# Patient Record
Sex: FEMALE | Race: WHITE | Employment: FULL TIME | URBAN - METROPOLITAN AREA
[De-identification: names, ages, dates, MRNs, and addresses within clinical notes are randomized per-mention and may not be internally consistent; named-entity substitution may affect disease eponyms.]

---

## 2022-03-30 ENCOUNTER — HOSPITAL ENCOUNTER (INPATIENT)
Age: 32
LOS: 1 days | Discharge: HOME | End: 2022-04-01
Attending: OBSTETRICS & GYNECOLOGY | Admitting: OBSTETRICS & GYNECOLOGY
Payer: COMMERCIAL

## 2022-03-30 PROBLEM — Z34.90 PREGNANCY: Status: ACTIVE | Noted: 2022-03-30

## 2022-03-30 LAB
ABO + RH BLD: NORMAL
ALBUMIN SERPL-MCNC: 2.4 G/DL (ref 3.4–5)
ALBUMIN/GLOB SERPL: 0.6 {RATIO} (ref 0.8–1.7)
ALP SERPL-CCNC: 99 U/L (ref 45–117)
ALT SERPL-CCNC: 19 U/L (ref 13–56)
ANION GAP SERPL CALC-SCNC: 7 MMOL/L (ref 3–18)
AST SERPL-CCNC: 19 U/L (ref 10–38)
BASOPHILS # BLD: 0.1 K/UL (ref 0–0.1)
BASOPHILS NFR BLD: 0 % (ref 0–2)
BILIRUB SERPL-MCNC: 0.2 MG/DL (ref 0.2–1)
BLOOD GROUP ANTIBODIES SERPL: NORMAL
BUN SERPL-MCNC: 10 MG/DL (ref 7–18)
BUN/CREAT SERPL: 19 (ref 12–20)
CALCIUM SERPL-MCNC: 8 MG/DL (ref 8.5–10.1)
CHLORIDE SERPL-SCNC: 107 MMOL/L (ref 100–111)
CO2 SERPL-SCNC: 25 MMOL/L (ref 21–32)
CREAT SERPL-MCNC: 0.54 MG/DL (ref 0.6–1.3)
DIFFERENTIAL METHOD BLD: ABNORMAL
EOSINOPHIL # BLD: 0.1 K/UL (ref 0–0.4)
EOSINOPHIL NFR BLD: 1 % (ref 0–5)
ERYTHROCYTE [DISTWIDTH] IN BLOOD BY AUTOMATED COUNT: 11.9 % (ref 11.6–14.5)
FIBRONECTIN FETAL VAG QL: POSITIVE
GLOBULIN SER CALC-MCNC: 3.9 G/DL (ref 2–4)
GLUCOSE SERPL-MCNC: 73 MG/DL (ref 74–99)
HCT VFR BLD AUTO: 32 % (ref 35–45)
HGB BLD-MCNC: 11 G/DL (ref 12–16)
IMM GRANULOCYTES # BLD AUTO: 0.1 K/UL (ref 0–0.04)
IMM GRANULOCYTES NFR BLD AUTO: 1 % (ref 0–0.5)
LYMPHOCYTES # BLD: 2.2 K/UL (ref 0.9–3.6)
LYMPHOCYTES NFR BLD: 17 % (ref 21–52)
MCH RBC QN AUTO: 31.6 PG (ref 24–34)
MCHC RBC AUTO-ENTMCNC: 34.4 G/DL (ref 31–37)
MCV RBC AUTO: 92 FL (ref 78–100)
MONOCYTES # BLD: 0.7 K/UL (ref 0.05–1.2)
MONOCYTES NFR BLD: 5 % (ref 3–10)
NEUTS SEG # BLD: 10.1 K/UL (ref 1.8–8)
NEUTS SEG NFR BLD: 76 % (ref 40–73)
NRBC # BLD: 0 K/UL (ref 0–0.01)
NRBC BLD-RTO: 0 PER 100 WBC
PLATELET # BLD AUTO: 286 K/UL (ref 135–420)
PMV BLD AUTO: 10.5 FL (ref 9.2–11.8)
POTASSIUM SERPL-SCNC: 4.2 MMOL/L (ref 3.5–5.5)
PROT SERPL-MCNC: 6.3 G/DL (ref 6.4–8.2)
RBC # BLD AUTO: 3.48 M/UL (ref 4.2–5.3)
SODIUM SERPL-SCNC: 139 MMOL/L (ref 136–145)
SPECIMEN EXP DATE BLD: NORMAL
WBC # BLD AUTO: 13.3 K/UL (ref 4.6–13.2)

## 2022-03-30 PROCEDURE — 86900 BLOOD TYPING SEROLOGIC ABO: CPT

## 2022-03-30 PROCEDURE — 85025 COMPLETE CBC W/AUTO DIFF WBC: CPT

## 2022-03-30 PROCEDURE — 87086 URINE CULTURE/COLONY COUNT: CPT

## 2022-03-30 PROCEDURE — 74011250636 HC RX REV CODE- 250/636

## 2022-03-30 PROCEDURE — 74011250637 HC RX REV CODE- 250/637: Performed by: OBSTETRICS & GYNECOLOGY

## 2022-03-30 PROCEDURE — 80053 COMPREHEN METABOLIC PANEL: CPT

## 2022-03-30 PROCEDURE — 4A1HXCZ MONITORING OF PRODUCTS OF CONCEPTION, CARDIAC RATE, EXTERNAL APPROACH: ICD-10-PCS | Performed by: OBSTETRICS & GYNECOLOGY

## 2022-03-30 PROCEDURE — 74011250636 HC RX REV CODE- 250/636: Performed by: OBSTETRICS & GYNECOLOGY

## 2022-03-30 PROCEDURE — 82731 ASSAY OF FETAL FIBRONECTIN: CPT

## 2022-03-30 PROCEDURE — 75410000002 HC LABOR FEE PER 1 HR

## 2022-03-30 PROCEDURE — 75410000000 HC DELIVERY VAGINAL/SINGLE

## 2022-03-30 PROCEDURE — 88307 TISSUE EXAM BY PATHOLOGIST: CPT

## 2022-03-30 PROCEDURE — 75410000003 HC RECOV DEL/VAG/CSECN EA 0.5 HR

## 2022-03-30 RX ORDER — PROMETHAZINE HYDROCHLORIDE 25 MG/ML
25 INJECTION, SOLUTION INTRAMUSCULAR; INTRAVENOUS
Status: DISCONTINUED | OUTPATIENT
Start: 2022-03-30 | End: 2022-04-01 | Stop reason: HOSPADM

## 2022-03-30 RX ORDER — LANOLIN ALCOHOL/MO/W.PET/CERES
325 CREAM (GRAM) TOPICAL
COMMUNITY

## 2022-03-30 RX ORDER — SUCRALFATE 1 G/1
1 TABLET ORAL ONCE
Status: DISPENSED | OUTPATIENT
Start: 2022-03-30 | End: 2022-03-30

## 2022-03-30 RX ORDER — INDOMETHACIN 25 MG/1
50 CAPSULE ORAL
Status: DISCONTINUED | OUTPATIENT
Start: 2022-03-30 | End: 2022-04-01 | Stop reason: HOSPADM

## 2022-03-30 RX ORDER — BUPROPION HYDROCHLORIDE 150 MG/1
150 TABLET ORAL DAILY
COMMUNITY

## 2022-03-30 RX ORDER — AMOXICILLIN 250 MG
1 CAPSULE ORAL
Status: DISCONTINUED | OUTPATIENT
Start: 2022-03-30 | End: 2022-04-01 | Stop reason: HOSPADM

## 2022-03-30 RX ORDER — MAGNESIUM SULFATE HEPTAHYDRATE 40 MG/ML
INJECTION, SOLUTION INTRAVENOUS
Status: COMPLETED
Start: 2022-03-30 | End: 2022-03-30

## 2022-03-30 RX ORDER — OXYCODONE AND ACETAMINOPHEN 5; 325 MG/1; MG/1
2 TABLET ORAL
Status: DISCONTINUED | OUTPATIENT
Start: 2022-03-30 | End: 2022-04-01 | Stop reason: HOSPADM

## 2022-03-30 RX ORDER — IBUPROFEN 400 MG/1
800 TABLET ORAL
Status: DISCONTINUED | OUTPATIENT
Start: 2022-03-30 | End: 2022-04-01 | Stop reason: HOSPADM

## 2022-03-30 RX ORDER — MAGNESIUM SULFATE HEPTAHYDRATE 40 MG/ML
4 INJECTION, SOLUTION INTRAVENOUS ONCE
Status: COMPLETED | OUTPATIENT
Start: 2022-03-30 | End: 2022-03-30

## 2022-03-30 RX ORDER — INDOMETHACIN 25 MG/1
50 CAPSULE ORAL ONCE
Status: DISPENSED | OUTPATIENT
Start: 2022-03-30 | End: 2022-03-30

## 2022-03-30 RX ORDER — SODIUM CHLORIDE, SODIUM LACTATE, POTASSIUM CHLORIDE, CALCIUM CHLORIDE 600; 310; 30; 20 MG/100ML; MG/100ML; MG/100ML; MG/100ML
125 INJECTION, SOLUTION INTRAVENOUS CONTINUOUS
Status: DISCONTINUED | OUTPATIENT
Start: 2022-03-30 | End: 2022-04-01 | Stop reason: HOSPADM

## 2022-03-30 RX ORDER — MAGNESIUM SULFATE HEPTAHYDRATE 40 MG/ML
2 INJECTION, SOLUTION INTRAVENOUS CONTINUOUS
Status: DISCONTINUED | OUTPATIENT
Start: 2022-03-31 | End: 2022-04-01 | Stop reason: HOSPADM

## 2022-03-30 RX ORDER — LANOLIN ALCOHOL/MO/W.PET/CERES
3 CREAM (GRAM) TOPICAL
Status: DISCONTINUED | OUTPATIENT
Start: 2022-03-30 | End: 2022-04-01 | Stop reason: HOSPADM

## 2022-03-30 RX ORDER — BUTORPHANOL TARTRATE 2 MG/ML
2 INJECTION INTRAMUSCULAR; INTRAVENOUS
Status: DISCONTINUED | OUTPATIENT
Start: 2022-03-30 | End: 2022-04-01 | Stop reason: HOSPADM

## 2022-03-30 RX ORDER — ACETAMINOPHEN 325 MG/1
650 TABLET ORAL
Status: DISCONTINUED | OUTPATIENT
Start: 2022-03-30 | End: 2022-04-01 | Stop reason: HOSPADM

## 2022-03-30 RX ORDER — SWAB
1 SWAB, NON-MEDICATED MISCELLANEOUS DAILY
COMMUNITY

## 2022-03-30 RX ORDER — ONDANSETRON 2 MG/ML
4 INJECTION INTRAMUSCULAR; INTRAVENOUS
Status: DISCONTINUED | OUTPATIENT
Start: 2022-03-30 | End: 2022-04-01 | Stop reason: HOSPADM

## 2022-03-30 RX ORDER — SUCRALFATE 1 G/1
1 TABLET ORAL
Status: DISCONTINUED | OUTPATIENT
Start: 2022-03-30 | End: 2022-04-01 | Stop reason: HOSPADM

## 2022-03-30 RX ORDER — BETAMETHASONE SODIUM PHOSPHATE AND BETAMETHASONE ACETATE 3; 3 MG/ML; MG/ML
12 INJECTION, SUSPENSION INTRA-ARTICULAR; INTRALESIONAL; INTRAMUSCULAR; SOFT TISSUE EVERY 24 HOURS
Status: DISCONTINUED | OUTPATIENT
Start: 2022-03-30 | End: 2022-04-01 | Stop reason: HOSPADM

## 2022-03-30 RX ORDER — CALCIUM GLUCONATE 20 MG/ML
1 INJECTION, SOLUTION INTRAVENOUS AS NEEDED
Status: DISCONTINUED | OUTPATIENT
Start: 2022-03-30 | End: 2022-04-01 | Stop reason: HOSPADM

## 2022-03-30 RX ADMIN — SODIUM CHLORIDE, POTASSIUM CHLORIDE, SODIUM LACTATE AND CALCIUM CHLORIDE 1000 ML: 600; 310; 30; 20 INJECTION, SOLUTION INTRAVENOUS at 09:24

## 2022-03-30 RX ADMIN — MAGNESIUM SULFATE HEPTAHYDRATE 4 G: 40 INJECTION, SOLUTION INTRAVENOUS at 10:40

## 2022-03-30 RX ADMIN — BETAMETHASONE SODIUM PHOSPHATE AND BETAMETHASONE ACETATE 12 MG: 3; 3 INJECTION, SUSPENSION INTRA-ARTICULAR; INTRALESIONAL; INTRAMUSCULAR at 10:58

## 2022-03-30 RX ADMIN — MAGNESIUM SULFATE HEPTAHYDRATE 2 G/HR: 40 INJECTION, SOLUTION INTRAVENOUS at 11:01

## 2022-03-30 RX ADMIN — BUTORPHANOL TARTRATE 2 MG: 2 INJECTION, SOLUTION INTRAMUSCULAR; INTRAVENOUS at 11:02

## 2022-03-30 RX ADMIN — SUCRALFATE 1 G: 1 TABLET ORAL at 10:38

## 2022-03-30 RX ADMIN — INDOMETHACIN 50 MG: 25 CAPSULE ORAL at 10:38

## 2022-03-30 RX ADMIN — MAGNESIUM SULFATE IN WATER 2 G/HR: 40 INJECTION, SOLUTION INTRAVENOUS at 11:01

## 2022-03-30 NOTE — H&P
Ostetrical History and Physical    Subjective:     Date of Admission: 3/30/2022    Patient is a 36 y.o.  female admitted with  labor started at 7 am with ctx suddenly, no dysuria or lof, no bleeding. pnc uncomplicated. For Obstetric history, see prenantal.    Past Medical History:   Diagnosis Date    Acid indigestion     Asthma     Attention deficit disorder     Depression     Dermatophytosis       Past Surgical History:   Procedure Laterality Date    ARTHROTOMY,OPEN REPAIR MENISCUS      HX ACL RECONSTRUCTION      HX HEENT      wisdom teeth    HX ORTHOPAEDIC      RT KNEE MENISCUS       Prior to Admission medications    Medication Sig Start Date End Date Taking? Authorizing Provider   prenatal vit-iron fumarate-fa (PRENATAL PLUS with IRON) 28 mg iron- 800 mcg tab Take 1 Tablet by mouth daily. Yes Provider, Historical   ferrous sulfate (Iron) 325 mg (65 mg iron) tablet Take 325 mg by mouth Daily (before breakfast). Yes Provider, Historical     No Known Allergies   Social History     Tobacco Use    Smoking status: Former Smoker    Smokeless tobacco: Never Used   Substance Use Topics    Alcohol use: Not Currently     Comment: rare      Family History   Problem Relation Age of Onset    Hypertension Father     Elevated Lipids Father     Diabetes Maternal Uncle     Cancer Maternal Grandmother     Diabetes Maternal Grandfather         Review of Systems    Objective:     Height 5' 4\" (1.626 m), weight 80.3 kg (177 lb). No data recorded. No intake/output data recorded. No intake/output data recorded. @[email or web address]    Pelvic: Cervix5, Effaced:> 50%Station:-2  Ultrasound-cephalic   Data Review:   No results found for this or any previous visit (from the past 24 hour(s)).   Monitor:  Reactivity:present Variability:present Baseline:within normal limits    Assessment:ptl     Active Problems:    Pregnancy (3/30/2022)        Plan:celestone, pcn, indocin, double we will be able to stop ptl but will try     Prophylaxis:  Deep Vein Thrombosis Protocol Active:Yes    Check labs:    Check  Prenatal:    Disposition    Total time spent with patient:In-Patient    Signed By: Mary Alice Jensen MD                         March 30, 2022

## 2022-03-30 NOTE — PROGRESS NOTES
Patient presents to labor and delivery with complaints of contractions that started this am, also states she lost her mucous plus     0945- SVE- 3-4/80/-1    2835- Called Dr. Ramone Capellan    0557- Patient nauseated    1020- Dr. Ramone Capellan at bedside, SVE- 6-7, vernon placed    1106- Patient moved to labor rm2    1115- VANITA Mills (NICU)RN,  (RAMU) at bedside     1117- KRIS Ott CNM at bedside    1123- TOB live female infant    26- Patient up to restroom

## 2022-03-30 NOTE — PROGRESS NOTES
1117: called to room by nursing staff for eminent delivery of , 30 week patient patient of [facility]. Upon arrival in the room, patient found to be involuntarily pushing: SVE completed and confirmed C/C/+2. Lu catheter removed by nursing staff and patient prepared for delivery. 1123: With strong maternal effort, infant head and body delivered without difficulty en caul. Membranes ruptured, cord clamped and cut by CNM at approximately 30 sec of life. Infant handed off to awaiting  staff in stable condition with APGARS of 4/7/8. See nursery notes for additional information. 1127: Delivery of placenta, connolly, intact-sent to lab for evaluation. Perineum intact, [email or web address], EBL 150cc. Mom left in stable condition.

## 2022-03-30 NOTE — PROGRESS NOTES
Delivery Note    Obstetrician: zeny    Assistant: none    Pre-Delivery Diagnosis:  pregnancy <37 weeks and Single fetus    Post-Delivery Diagnosis: Female    Intrapartum Event: None    Procedure: Spontaneous vaginal delivery , nicu in attendance for delivery , baby intubated    Epidural: NO    Monitor:  Fetal Heart Tones - External and Uterine Contractions - External    Indications for instrumental delivery: none    Estimated Blood Loss: 150cc    Episiotomy: none    Laceration(s):  none    Laceration(s) repair: NO    Presentation: Cephalic    Fetal Description: alejandra    Fetal Position: Occiput Anterior    Birth Weight: pending    Birth Length: pending    Apgar - One Minute: pending from nicu    Apgar - Five Minutes: pending from nicu    Umbilical Cord: 3 vessels present    Specimens: placenta           Complications:  none           Cord Blood Results: This patient has no babies on file.   Prenatal Labs:     Lab Results   Component Value Date/Time    ABO/Rh(D) PENDING 2022 09:28 AM        Attending Attestation: I was present and scrubbed for the key portions of the procedure    Signed By:  Dale De Leon MD     2022 What Type Of Note Output Would You Prefer (Optional)?: Standard Output How Severe Are Your Spot(S)?: mild Have Your Spot(S) Been Treated In The Past?: has not been treated Hpi Title: Evaluation of Skin Lesions

## 2022-03-30 NOTE — PROGRESS NOTES
1240 TRANSFER - IN REPORT:    Verbal report received from ISSAC Johnson RN(name) on [employer]  being received from L & D(unit) for routine progression of care      Report consisted of patients Situation, Background, Assessment and   Recommendations(SBAR). Information from the following report(s) SBAR, Kardex, Intake/Output, MAR and Recent Results was reviewed with the receiving nurse. Pt oriented to room and unit, educated on EPDS form, pain management and plan of care. Pt denies any pain or needs at this time, educated to call staff for assistance to [facility] for second void. Opportunity for questions and clarification was provided. 1255 Assessment completed,  Fundal check performed, uterus is firm @ U with small bleeding, no clots observed. Pt denies any pain or needs at this time. 1312  Fundal check performed, uterus is firm @ U with small bleeding, no clots observed. Pt denies any pain or needs at this time. 1335 Fundal check performed, uterus is firm @ U with small bleeding, no clots observed, pad changed. Pt supplied breast pump R/T baby in NICU, educated on use and to pump every 3-4 hours to stimulate milk production, no questions at this time. Pt denies any pain or needs at this time. 1440 Assisted pt to  for second void of 400 cc urine, pt passed a dime and quarter sized clot, no placental fragments observed, pt observed to have a steady gait. Pt returned to bed fundus firm @ U with scant bleeding at this this,  pt educated to call staff if she feels any gushes or passes any more clots. Pt verbalized understanding. 1610 Pt denies any clots, needs or pain at this time. 1910 Bedside and Verbal shift change report given to HALLEY Douglas RN (oncoming nurse) by Rocio Hagen RN (offgoing nurse). Report included the following information SBAR, Kardex, Intake/Output, MAR and Recent Results.

## 2022-03-31 PROBLEM — Z3A.30 30 WEEKS GESTATION OF PREGNANCY: Status: ACTIVE | Noted: 2022-03-31

## 2022-03-31 LAB
BACTERIA SPEC CULT: NORMAL
HCT VFR BLD AUTO: 27.2 % (ref 35–45)
HGB BLD-MCNC: 9.4 G/DL (ref 12–16)
SERVICE CMNT-IMP: NORMAL

## 2022-03-31 PROCEDURE — 74011250637 HC RX REV CODE- 250/637: Performed by: OBSTETRICS & GYNECOLOGY

## 2022-03-31 PROCEDURE — 93005 ELECTROCARDIOGRAM TRACING: CPT

## 2022-03-31 PROCEDURE — 65270000029 HC RM PRIVATE

## 2022-03-31 PROCEDURE — 36415 COLL VENOUS BLD VENIPUNCTURE: CPT

## 2022-03-31 PROCEDURE — 85018 HEMOGLOBIN: CPT

## 2022-03-31 PROCEDURE — 74011250636 HC RX REV CODE- 250/636: Performed by: ADVANCED PRACTICE MIDWIFE

## 2022-03-31 PROCEDURE — 74011250636 HC RX REV CODE- 250/636: Performed by: OBSTETRICS & GYNECOLOGY

## 2022-03-31 RX ADMIN — ACETAMINOPHEN 650 MG: 325 TABLET ORAL at 07:35

## 2022-03-31 RX ADMIN — DOCUSATE SODIUM 50 MG AND SENNOSIDES 8.6 MG 1 TABLET: 8.6; 5 TABLET, FILM COATED ORAL at 20:30

## 2022-03-31 RX ADMIN — SODIUM CHLORIDE, POTASSIUM CHLORIDE, SODIUM LACTATE AND CALCIUM CHLORIDE 500 ML: 600; 310; 30; 20 INJECTION, SOLUTION INTRAVENOUS at 20:30

## 2022-03-31 RX ADMIN — ACETAMINOPHEN 650 MG: 325 TABLET ORAL at 19:45

## 2022-03-31 RX ADMIN — ONDANSETRON 4 MG: 2 INJECTION INTRAMUSCULAR; INTRAVENOUS at 19:45

## 2022-03-31 NOTE — PROGRESS NOTES
Progress Note    Patient: Padmini Greco MRN: [id number]  SSN: [id number]    YOB: 1990  Age: 36 y.o. Sex: female    [facility]      Subjective:     Postpartum Day: 1            Delivery: vaginal delivery    The patient feels well. The patient denies emotional concerns. The baby is in NICU, doing well so far. The patient is ambulating well. The patient  tolerating a normal diet. Flatus has been passed.     Objective:      Patient Vitals for the past 24 hrs:   BP Temp Pulse Resp SpO2 Height Weight   03/30/22 2342 (!) 117/42 98 °F (36.7 °C) 83 18 100 % -- --   03/30/22 1600 131/63 98 °F (36.7 °C) 70 17 100 % -- --   03/30/22 1255 133/66 97.4 °F (36.3 °C) 61 16 98 % -- --   03/30/22 1200 108/67 -- 69 -- -- -- --   03/30/22 1146 121/72 -- 74 -- -- -- --   03/30/22 1102 -- -- -- -- 100 % -- --   03/30/22 1057 -- -- -- -- 100 % -- --   03/30/22 1052 -- -- -- -- 99 % -- --   03/30/22 1047 -- -- -- -- 100 % -- --   03/30/22 1042 -- -- -- -- 100 % -- --   03/30/22 1037 -- -- -- -- 100 % -- --   03/30/22 1032 -- -- -- -- 100 % -- --   03/30/22 1027 -- -- -- -- 100 % -- --   03/30/22 1024 -- -- -- -- 100 % -- --   03/30/22 1019 -- -- -- -- 100 % -- --   03/30/22 1010 -- -- -- -- 100 % -- --   03/30/22 1009 -- -- -- -- 100 % -- --   03/30/22 1005 -- -- -- -- 100 % -- --   03/30/22 1000 -- -- -- -- 100 % -- --   03/30/22 0956 -- -- -- -- -- 5' 4\" (1.626 m) 80.3 kg (177 lb)   03/30/22 0955 -- -- -- -- 98 % -- --   03/30/22 0953 -- -- -- -- 100 % -- --   03/30/22 0948 -- -- -- -- 100 % -- --   03/30/22 0943 -- -- -- -- 100 % -- --   03/30/22 0938 -- -- -- -- 100 % -- --   03/30/22 0933 -- -- -- -- 100 % -- --   03/30/22 0928 -- -- -- -- 100 % -- --   03/30/22 0905 -- -- -- -- 100 % -- --   03/30/22 0900 126/64 -- (!) 57 -- 100 % -- --   03/30/22 0855 -- -- -- -- 90 % -- --   03/30/22 0849 -- -- -- -- 100 % -- --   03/30/22 0844 -- -- -- -- 100 % -- --   03/30/22 0840 -- -- -- -- 100 % -- --   03/30/22 1936 -- -- -- -- 100 % -- --   03/30/22 0833 -- -- -- -- 100 % -- --   03/30/22 0830 (!) 131/59 -- (!) 53 -- -- -- --   03/30/22 0828 -- -- -- -- 100 % -- --   03/30/22 0823 -- -- -- -- 100 % -- --   03/30/22 0818 -- -- -- -- 100 % -- --   03/30/22 0813 -- -- -- -- 97 % -- --   03/30/22 0811 134/75 -- (!) 59 -- -- -- --     Lochia:  appropriate   Uterine Fundus:   firm   Fundus Location:  -3   Incision:     DVT Evaluation:  No evidence of DVT seen on physical exam.  Negative Hansel's sign. No cords or calf tenderness. No significant calf/ankle edema. Lab/Data Review: All lab results for the last 24 hours reviewed. LABS: Recent Results (from the past 48 hour(s))   CBC WITH AUTOMATED DIFF    Collection Time: 03/30/22  9:28 AM   Result Value Ref Range    WBC 13.3 (H) 4.6 - 13.2 K/uL    RBC 3.48 (L) 4.20 - 5.30 M/uL    HGB 11.0 (L) 12.0 - 16.0 g/dL    HCT 32.0 (L) 35.0 - 45.0 %    MCV 92.0 78.0 - 100.0 FL    MCH 31.6 24.0 - 34.0 PG    MCHC 34.4 31.0 - 37.0 g/dL    RDW 11.9 11.6 - 14.5 %    PLATELET 973 441 - 409 K/uL    MPV 10.5 9.2 - 11.8 FL    NRBC 0.0 0  WBC    ABSOLUTE NRBC 0.00 0.00 - 0.01 K/uL    NEUTROPHILS 76 (H) 40 - 73 %    LYMPHOCYTES 17 (L) 21 - 52 %    MONOCYTES 5 3 - 10 %    EOSINOPHILS 1 0 - 5 %    BASOPHILS 0 0 - 2 %    IMMATURE GRANULOCYTES 1 (H) 0.0 - 0.5 %    ABS. NEUTROPHILS 10.1 (H) 1.8 - 8.0 K/UL    ABS. LYMPHOCYTES 2.2 0.9 - 3.6 K/UL    ABS. MONOCYTES 0.7 0.05 - 1.2 K/UL    ABS. EOSINOPHILS 0.1 0.0 - 0.4 K/UL    ABS. BASOPHILS 0.1 0.0 - 0.1 K/UL    ABS. IMM.  GRANS. 0.1 (H) 0.00 - 0.04 K/UL    DF AUTOMATED     TYPE & SCREEN    Collection Time: 03/30/22  9:28 AM   Result Value Ref Range    Crossmatch Expiration 04/02/2022,2359     ABO/Rh(D) Benita Faustin NEGATIVE     Antibody screen NEG    METABOLIC PANEL, COMPREHENSIVE    Collection Time: 03/30/22  9:28 AM   Result Value Ref Range    Sodium 139 136 - 145 mmol/L    Potassium 4.2 3.5 - 5.5 mmol/L    Chloride 107 100 - 111 mmol/L    CO2 25 21 - 32 mmol/L Anion gap 7 3.0 - 18 mmol/L    Glucose 73 (L) 74 - 99 mg/dL    BUN 10 7.0 - 18 MG/DL    Creatinine 0.54 (L) 0.6 - 1.3 MG/DL    BUN/Creatinine ratio 19 12 - 20      GFR est AA >60 >60 ml/min/1.73m2    GFR est non-AA >60 >60 ml/min/1.73m2    Calcium 8.0 (L) 8.5 - 10.1 MG/DL    Bilirubin, total 0.2 0.2 - 1.0 MG/DL    ALT (SGPT) 19 13 - 56 U/L    AST (SGOT) 19 10 - 38 U/L    Alk. phosphatase 99 45 - 117 U/L    Protein, total 6.3 (L) 6.4 - 8.2 g/dL    Albumin 2.4 (L) 3.4 - 5.0 g/dL    Globulin 3.9 2.0 - 4.0 g/dL    A-G Ratio 0.6 (L) 0.8 - 1.7     FETAL FIBRONECTIN    Collection Time: 03/30/22  9:29 AM   Result Value Ref Range    Fetal fibronectin Positive (A) NEG     HGB & HCT    Collection Time: 03/31/22  3:38 AM   Result Value Ref Range    HGB 9.4 (L) 12.0 - 16.0 g/dL    HCT 27.2 (L) 35.0 - 45.0 %        Assessment:     Status post: Doing well postpartum vaginal delivery     Plan:     Postpartum care discussed including diet, ambulation, and actvitiy restrictions. Discharge instructions and questions answered.        Signed By: Anish Pierce MD     March 31, 2022

## 2022-03-31 NOTE — PROGRESS NOTES
0710 Bedside and Verbal shift change report given to Zi Sandhu RN (oncoming nurse) by Alka Jones RN (offgoing nurse). Report included the following information SBAR, Kardex, Intake/Output, MAR and Recent Results. Pt educated on pain management. 0735 Pt states 4/10 h/a, PRN Tylenol administered will follow up on effectiveness. 1015 Received report from CNA that pt had decreased pulse, assessment performed and pulse re-assessed currently at 66, pt denies any pain, clots or needs at this time. 1546 Reassessment completed,pt states she passed a dime sized clot, pt encouraged to call staff to monitor for reoccurance, pt currently firm at U with scant bleeding. Pt denies any pain or needs at this time. [address] w/ KRIS O[employer] Leader R/T pt C/O pressure and discomfort to upper mid chest, VS assessed pulse at 40, /58, 100 room air. Reassessed pulse 46, /70, 100 room air. Pt denies any pain, Fundus firm @ U with scant bleeding, no visible clots. EKG ordered. [address] with Hugo in Respiratory, will send someone up to complete EKG. 1920 Bedside and Verbal shift change report given to HALLEY Jones RN (oncoming nurse) by Zi Sandhu RN (offgoing nurse). Report included the following information SBAR, Kardex, Intake/Output, MAR and Recent Results.

## 2022-03-31 NOTE — PROGRESS NOTES
Problem: Patient Education: Go to Patient Education Activity  Goal: Patient/Family Education  Outcome: Progressing Towards Goal     Problem: Vaginal Delivery: Day of Deliver-Laboring  Goal: Off Pathway (Use only if patient is Off Pathway)  Outcome: Progressing Towards Goal  Goal: Activity/Safety  Outcome: Progressing Towards Goal  Goal: Consults, if ordered  Outcome: Progressing Towards Goal  Goal: Diagnostic Test/Procedures  Outcome: Progressing Towards Goal  Goal: Nutrition/Diet  Outcome: Progressing Towards Goal  Goal: Discharge Planning  Outcome: Progressing Towards Goal  Goal: Medications  Outcome: Progressing Towards Goal  Goal: Respiratory  Outcome: Progressing Towards Goal  Goal: Treatments/Interventions/Procedures  Outcome: Progressing Towards Goal  Goal: *Vital signs within defined limits  Outcome: Progressing Towards Goal  Goal: *Labs within defined limits  Outcome: Progressing Towards Goal  Goal: *Hemodynamically stable  Outcome: Progressing Towards Goal  Goal: *Optimal pain control at patient's stated goal  Outcome: Progressing Towards Goal     Problem: Vaginal Delivery: Day of Delivery-Post delivery  Goal: Off Pathway (Use only if patient is Off Pathway)  Outcome: Progressing Towards Goal  Goal: Activity/Safety  Outcome: Progressing Towards Goal  Goal: Consults, if ordered  Outcome: Progressing Towards Goal  Goal: Nutrition/Diet  Outcome: Progressing Towards Goal  Goal: Discharge Planning  Outcome: Progressing Towards Goal  Goal: Medications  Outcome: Progressing Towards Goal  Goal: Treatments/Interventions/Procedures  Outcome: Progressing Towards Goal  Goal: *Vital signs within defined limits  Outcome: Progressing Towards Goal  Goal: *Labs within defined limits  Outcome: Progressing Towards Goal  Goal: *Hemodynamically stable  Outcome: Progressing Towards Goal  Goal: *Optimal pain control at patient's stated goal  Outcome: Progressing Towards Goal  Goal: *Participates in infant care  Outcome: Progressing Towards Goal  Goal: *Demonstrates progressive activity  Outcome: Progressing Towards Goal  Goal: *Tolerating diet  Outcome: Progressing Towards Goal     Problem: Vaginal Delivery: Postpartum Day 1  Goal: Off Pathway (Use only if patient is Off Pathway)  Outcome: Progressing Towards Goal  Goal: Activity/Safety  Outcome: Progressing Towards Goal  Goal: Consults, if ordered  Outcome: Progressing Towards Goal  Goal: Diagnostic Test/Procedures  Outcome: Progressing Towards Goal  Goal: Nutrition/Diet  Outcome: Progressing Towards Goal  Goal: Discharge Planning  Outcome: Progressing Towards Goal  Goal: Medications  Outcome: Progressing Towards Goal  Goal: Treatments/Interventions/Procedures  Outcome: Progressing Towards Goal  Goal: Psychosocial  Outcome: Progressing Towards Goal  Goal: *Vital signs within defined limits  Outcome: Progressing Towards Goal  Goal: *Labs within defined limits  Outcome: Progressing Towards Goal  Goal: *Hemodynamically stable  Outcome: Progressing Towards Goal  Goal: *Optimal pain control at patient's stated goal  Outcome: Progressing Towards Goal  Goal: *Participates in infant care  Outcome: Progressing Towards Goal  Goal: *Demonstrates progressive activity  Outcome: Progressing Towards Goal  Goal: *Performs self perineal care  Outcome: Progressing Towards Goal  Goal: *Appropriate parent-infant bonding  Outcome: Progressing Towards Goal  Goal: *Tolerating diet  Outcome: Progressing Towards Goal  Goal: *Performs self breast care  Outcome: Progressing Towards Goal     Problem: Vaginal Delivery: Postpartum 2  Goal: Off Pathway (Use only if patient is Off Pathway)  Outcome: Progressing Towards Goal  Goal: Activity/Safety  Outcome: Progressing Towards Goal  Goal: Consults, if ordered  Outcome: Progressing Towards Goal  Goal: Nutrition/Diet  Outcome: Progressing Towards Goal  Goal: Discharge Planning  Outcome: Progressing Towards Goal  Goal: Medications  Outcome: Progressing Towards Goal  Goal: Treatments/Interventions/Procedures  Outcome: Progressing Towards Goal  Goal: Psychosocial  Outcome: Progressing Towards Goal     Problem: Vaginal Delivery: Discharge Outcomes  Goal: *Verbalizes name, dosage, time, side effects, and number of days to continue medications  Outcome: Progressing Towards Goal  Goal: *Describes available resources and support systems  Outcome: Progressing Towards Goal  Goal: *No signs and symptoms of infection  Outcome: Progressing Towards Goal  Goal: *Birth certificate information completed  Outcome: Progressing Towards Goal  Goal: *Received and verbalizes understanding of discharge plan and instructions  Outcome: Progressing Towards Goal  Goal: *Vital signs within defined limits  Outcome: Progressing Towards Goal  Goal: *Labs within defined limits  Outcome: Progressing Towards Goal  Goal: *Hemodynamically stable  Outcome: Progressing Towards Goal  Goal: *Optimal pain control at patient's stated goal  Outcome: Progressing Towards Goal  Goal: *Participates in infant care  Outcome: Progressing Towards Goal  Goal: *Demonstrates progressive activity  Outcome: Progressing Towards Goal  Goal: *Appropriate parent-infant bonding  Outcome: Progressing Towards Goal  Goal: *Tolerating diet  Outcome: Progressing Towards Goal     Problem: Pain  Goal: *Control of Pain  Outcome: Progressing Towards Goal  Goal: *PALLIATIVE CARE:  Alleviation of Pain  Outcome: Progressing Towards Goal

## 2022-04-01 VITALS
HEART RATE: 37 BPM | TEMPERATURE: 98.1 F | BODY MASS INDEX: 30.22 KG/M2 | HEIGHT: 64 IN | SYSTOLIC BLOOD PRESSURE: 144 MMHG | RESPIRATION RATE: 18 BRPM | WEIGHT: 177 LBS | DIASTOLIC BLOOD PRESSURE: 67 MMHG | OXYGEN SATURATION: 100 %

## 2022-04-01 LAB
ATRIAL RATE: 45 BPM
CALCULATED P AXIS, ECG09: 24 DEGREES
CALCULATED R AXIS, ECG10: 53 DEGREES
CALCULATED T AXIS, ECG11: 56 DEGREES
DIAGNOSIS, 93000: NORMAL
P-R INTERVAL, ECG05: 150 MS
Q-T INTERVAL, ECG07: 456 MS
QRS DURATION, ECG06: 72 MS
QTC CALCULATION (BEZET), ECG08: 394 MS
VENTRICULAR RATE, ECG03: 45 BPM

## 2022-04-01 PROCEDURE — 74011250637 HC RX REV CODE- 250/637: Performed by: OBSTETRICS & GYNECOLOGY

## 2022-04-01 RX ADMIN — SUCRALFATE 1 G: 1 TABLET ORAL at 00:03

## 2022-04-01 RX ADMIN — SUCRALFATE 1 G: 1 TABLET ORAL at 07:48

## 2022-04-01 NOTE — PROGRESS NOTES
0710 Bedside and Verbal shift change report given to KELLEY Christian RN (oncoming nurse) by [facility] Gena. Rodolfo Denise RN (offgoing nurse). Report included the following information SBAR, Kardex, Procedure Summary, Intake/Output, MAR and Recent Results. 5947 shift assessment complete     0830 updated Dr. Tanvi Gutierrez on patients vitals    0913 patient ambulating back to room from NICU    6800 patient has no needs or concerns at this time    1050 patient in NICU    1240 Discharge education complete and e-signatures obtained. Waiting for Patient to call to be taken downstairs.     1300 patient discharged home

## 2022-04-01 NOTE — PROGRESS NOTES
Progress Note    Patient: Guanakito Maldonado MRN: [id number]  SSN: [id number]    YOB: 1990  Age: 36 y.o. Sex: female      Subjective:     Postpartum Day: 2     Delivery: vaginal delivery    The patient feels well. The patient denies emotional concerns. The baby misha cpap feeding through ngt. Baby is feeding via breast.  The patient is ambulating well. The patient  tolerating a normal diet. Flatus has been passed. Objective:      Patient Vitals for the past 8 hrs:   BP Temp Pulse Resp SpO2   04/01/22 0802 (!) 144/67 98.1 °F (36.7 °C) (!) 37 18 100 %   04/01/22 0800 (!) 149/78 98.2 °F (36.8 °C) (!) 40 18 100 %     LABS: Recent Results (from the past 24 hour(s))   EKG, 12 LEAD, INITIAL    Collection Time: 03/31/22  7:07 PM   Result Value Ref Range    Ventricular Rate 45 BPM    Atrial Rate 45 BPM    P-R Interval 150 ms    QRS Duration 72 ms    Q-T Interval 456 ms    QTC Calculation (Bezet) 394 ms    Calculated P Axis 24 degrees    Calculated R Axis 53 degrees    Calculated T Axis 56 degrees    Diagnosis       Marked sinus bradycardia  Abnormal ECG  No previous ECGs available            Lochia:  appropriate   Uterine Fundus:   firm   Fundus Location:  -1   Incision:  no significant drainage   DVT Evaluation:  No evidence of DVT seen on physical exam.     Lab/Data Review: All lab results for the last 24 hours reviewed. Assessment:     Status post: Doing well postpartum vaginal delivery     Plan:     Postpartum care discussed including diet, ambulation, and actvitiy restrictions. Discharge instructions and questions answered for vaginal delivery.     Signed By: Piero Guzman MD     April 1, 2022

## 2022-04-01 NOTE — LACTATION NOTE
26 mom in the NICU. Will return. 1952 mom has been pumping q3 and expressing 1-5 mls. Reinforced pumping education. Provided mom with the 24 silicone flanges. Discussed normal expectations regarding pumping. Will remain available.

## 2022-04-01 NOTE — DISCHARGE SUMMARY
Gynecology Surgical Discharge Summary     Name: Adilene Sebastian MRN: [id number]  SSN: [id number]    YOB: 1990  Age: 36 y.o. Sex: female      Admit date: 3/30/2022    Discharge Date: 2022      Attending Physician: Dior Laughlin MD     Admission Diagnoses:  labor 29 wks    Discharge Diagnoses: Active Problems:    Pregnancy (3/30/2022)      30 weeks gestation of pregnancy (3/31/2022)         Procedures:  female 30 wks    Hospital Course: Hospital course was complicated by pt admitted in  labor and rapidly progresssed to full dilation. No evidence of abruption or infection on admission, membranes intact. Lesa Maid given magneium sufate for nueroprotection, steroids,pcn,. Pt went on to deliver  Viable female infant nicu in attendance. Pt did well pp and discharged home puping for baby, at discharge baby on cpap in nicu feeding by ngt. .    Significant Diagnostic Studies:   Recent Results (from the past 24 hour(s))   EKG, 12 LEAD, INITIAL    Collection Time: 22  7:07 PM   Result Value Ref Range    Ventricular Rate 45 BPM    Atrial Rate 45 BPM    P-R Interval 150 ms    QRS Duration 72 ms    Q-T Interval 456 ms    QTC Calculation (Bezet) 394 ms    Calculated P Axis 24 degrees    Calculated R Axis 53 degrees    Calculated T Axis 56 degrees    Diagnosis       Marked sinus bradycardia  Abnormal ECG  No previous ECGs available         Patient Instructions:   Current Discharge Medication List      CONTINUE these medications which have NOT CHANGED    Details   prenatal vit-iron fumarate-fa (PRENATAL PLUS with IRON) 28 mg iron- 800 mcg tab Take 1 Tablet by mouth daily. ferrous sulfate (Iron) 325 mg (65 mg iron) tablet Take 325 mg by mouth Daily (before breakfast). buPROPion XL (Wellbutrin XL) 150 mg tablet Take 150 mg by mouth daily. Activity: No sex, douching, or tampons for 6 weeks or as directed by your physician. No heavy lifting for 6 weeks.  No driving while taking pain medication. Diet: Resume pre-hospital diet  Wound Care: None needed    Follow-up Appointments   Procedures    FOLLOW UP VISIT Appointment in: Two Weeks     Standing Status:   Standing     Number of Occurrences:   1     Order Specific Question:   Appointment in     Answer:    Two Weeks        Signed By:  Piero Guzman MD     April 1, 2022

## 2022-04-01 NOTE — PROGRESS NOTES
Problem: Vaginal Delivery: Postpartum 2  Goal: Activity/Safety  Outcome: Progressing Towards Goal  Goal: Nutrition/Diet  Outcome: Progressing Towards Goal  Goal: Discharge Planning  Outcome: Progressing Towards Goal  Goal: Medications  Outcome: Progressing Towards Goal  Goal: Treatments/Interventions/Procedures  Outcome: Progressing Towards Goal  Goal: Psychosocial  Outcome: Progressing Towards Goal     Problem: Vaginal Delivery: Discharge Outcomes  Goal: *Verbalizes name, dosage, time, side effects, and number of days to continue medications  Outcome: Progressing Towards Goal  Goal: *Describes available resources and support systems  Outcome: Progressing Towards Goal  Goal: *No signs and symptoms of infection  Outcome: Progressing Towards Goal  Goal: *Birth certificate information completed  Outcome: Progressing Towards Goal  Goal: *Received and verbalizes understanding of discharge plan and instructions  Outcome: Progressing Towards Goal  Goal: *Vital signs within defined limits  Outcome: Progressing Towards Goal  Goal: *Labs within defined limits  Outcome: Progressing Towards Goal  Goal: *Hemodynamically stable  Outcome: Progressing Towards Goal  Goal: *Optimal pain control at patient's stated goal  Outcome: Progressing Towards Goal  Goal: *Participates in infant care  Outcome: Progressing Towards Goal  Goal: *Demonstrates progressive activity  Outcome: Progressing Towards Goal  Goal: *Appropriate parent-infant bonding  Outcome: Progressing Towards Goal  Goal: *Tolerating diet  Outcome: Progressing Towards Goal     Problem: Pain  Goal: *Control of Pain  Outcome: Progressing Towards Goal  Goal: *PALLIATIVE CARE:  Alleviation of Pain  Outcome: Progressing Towards Goal

## 2022-04-01 NOTE — DISCHARGE INSTRUCTIONS
POST DELIVERY DISCHARGE INSTRUCTIONS    Name: Mendel Bernard  YOB: 1990  Primary Diagnosis: Active Problems:    Pregnancy (3/30/2022)      30 weeks gestation of pregnancy (3/31/2022)      General:     Diet/Diet Restrictions:  Eight 8-ounce glasses of fluid daily (water, juices); avoid excessive caffeine intake. Meals/snacks as desired which are high in fiber and carbohydrates and low in fat and cholesterol. Physical Activity / Restrictions / Safety:     Avoid heavy lifting, no more that 8 lbs. For 2-3 weeks; limit use of stairs to 2 times daily for the first week home. No driving for one week. Avoid intercourse 4-6 weeks, no douching or tampon use. Check with obstetrician before starting or resuming an exercise program.       Discharge Instructions/Special Treatment/Home Care Needs:     Continue prenatal vitamins. Continue to use squirt bottle with warm water on your perineal area after each bathroom use until bleeding stops. Call your doctor for the following:     Fever over 100.4 degrees by mouth. Vaginal bleeding heavier than a normal menstrual period or clot larger than a golf ball. Red streaks or increased swelling of legs, painful red streaks on your breast.  Painful urination, constipation and increased pain or swelling or discharge with your vaginal area. If you feel extremely anxious or overwhelmed. If you have thoughts of harming yourself and/or your baby. Pain Management:     Pain Management:   Take Acetaminophen (Tylenol) or Ibuprofen (Advil, Motrin), as directed for pain. Use a warm Sitz bath 3 times daily to relieve episiotomy or hemorrhoidal discomfort. Heating pad to  incision as needed. For hemorrhoidal discomfort, use Tucks and Anusol cream as needed and directed.